# Patient Record
Sex: FEMALE | Race: WHITE
[De-identification: names, ages, dates, MRNs, and addresses within clinical notes are randomized per-mention and may not be internally consistent; named-entity substitution may affect disease eponyms.]

---

## 2018-01-24 ENCOUNTER — HOSPITAL ENCOUNTER (EMERGENCY)
Dept: HOSPITAL 17 - NEPD | Age: 23
Discharge: HOME | End: 2018-01-24
Payer: COMMERCIAL

## 2018-01-24 VITALS
HEART RATE: 96 BPM | OXYGEN SATURATION: 100 % | DIASTOLIC BLOOD PRESSURE: 62 MMHG | RESPIRATION RATE: 28 BRPM | TEMPERATURE: 99 F | SYSTOLIC BLOOD PRESSURE: 129 MMHG

## 2018-01-24 VITALS — WEIGHT: 187.39 LBS | BODY MASS INDEX: 29.41 KG/M2 | HEIGHT: 67 IN

## 2018-01-24 VITALS
DIASTOLIC BLOOD PRESSURE: 70 MMHG | RESPIRATION RATE: 26 BRPM | SYSTOLIC BLOOD PRESSURE: 120 MMHG | OXYGEN SATURATION: 100 % | HEART RATE: 88 BPM

## 2018-01-24 VITALS — HEART RATE: 92 BPM | SYSTOLIC BLOOD PRESSURE: 132 MMHG | DIASTOLIC BLOOD PRESSURE: 79 MMHG

## 2018-01-24 DIAGNOSIS — R07.89: Primary | ICD-10-CM

## 2018-01-24 DIAGNOSIS — J45.909: ICD-10-CM

## 2018-01-24 DIAGNOSIS — R05: ICD-10-CM

## 2018-01-24 DIAGNOSIS — Z79.899: ICD-10-CM

## 2018-01-24 DIAGNOSIS — E87.6: ICD-10-CM

## 2018-01-24 LAB
BASOPHILS # BLD AUTO: 0 TH/MM3 (ref 0–0.2)
BASOPHILS NFR BLD: 0.5 % (ref 0–2)
BUN SERPL-MCNC: 11 MG/DL (ref 7–18)
CALCIUM SERPL-MCNC: 9 MG/DL (ref 8.5–10.1)
CHLORIDE SERPL-SCNC: 111 MEQ/L (ref 98–107)
CREAT SERPL-MCNC: 0.78 MG/DL (ref 0.5–1)
D-DIMER: 0.29 MG/L FEU (ref 0–0.5)
EOSINOPHIL # BLD: 0.2 TH/MM3 (ref 0–0.4)
EOSINOPHIL NFR BLD: 2 % (ref 0–4)
ERYTHROCYTE [DISTWIDTH] IN BLOOD BY AUTOMATED COUNT: 13.4 % (ref 11.6–17.2)
GFR SERPLBLD BASED ON 1.73 SQ M-ARVRAT: 94 ML/MIN (ref 89–?)
GLUCOSE SERPL-MCNC: 91 MG/DL (ref 74–106)
HCO3 BLD-SCNC: 20.4 MEQ/L (ref 21–32)
HCT VFR BLD CALC: 36.8 % (ref 35–46)
HGB BLD-MCNC: 12.9 GM/DL (ref 11.6–15.3)
INR PPP: 1 RATIO
LIPASE: 243 U/L (ref 73–393)
LYMPHOCYTES # BLD AUTO: 1.7 TH/MM3 (ref 1–4.8)
LYMPHOCYTES NFR BLD AUTO: 22.9 % (ref 9–44)
MAGNESIUM SERPL-MCNC: 2 MG/DL (ref 1.5–2.5)
MCH RBC QN AUTO: 30 PG (ref 27–34)
MCHC RBC AUTO-ENTMCNC: 35.1 % (ref 32–36)
MCV RBC AUTO: 85.5 FL (ref 80–100)
MONOCYTE #: 0.7 TH/MM3 (ref 0–0.9)
MONOCYTES NFR BLD: 9.3 % (ref 0–8)
NEUTROPHILS # BLD AUTO: 5 TH/MM3 (ref 1.8–7.7)
NEUTROPHILS NFR BLD AUTO: 65.3 % (ref 16–70)
PLATELET # BLD: 257 TH/MM3 (ref 150–450)
PMV BLD AUTO: 9.2 FL (ref 7–11)
PROTHROMBIN TIME: 10.5 SEC (ref 9.8–11.6)
RBC # BLD AUTO: 4.31 MIL/MM3 (ref 4–5.3)
SODIUM SERPL-SCNC: 141 MEQ/L (ref 136–145)
TROPONIN I SERPL-MCNC: (no result) NG/ML (ref 0.02–0.05)
WBC # BLD AUTO: 7.6 TH/MM3 (ref 4–11)

## 2018-01-24 PROCEDURE — 83690 ASSAY OF LIPASE: CPT

## 2018-01-24 PROCEDURE — 96374 THER/PROPH/DIAG INJ IV PUSH: CPT

## 2018-01-24 PROCEDURE — 85610 PROTHROMBIN TIME: CPT

## 2018-01-24 PROCEDURE — 80048 BASIC METABOLIC PNL TOTAL CA: CPT

## 2018-01-24 PROCEDURE — 84703 CHORIONIC GONADOTROPIN ASSAY: CPT

## 2018-01-24 PROCEDURE — 71045 X-RAY EXAM CHEST 1 VIEW: CPT

## 2018-01-24 PROCEDURE — 85379 FIBRIN DEGRADATION QUANT: CPT

## 2018-01-24 PROCEDURE — 84484 ASSAY OF TROPONIN QUANT: CPT

## 2018-01-24 PROCEDURE — 99285 EMERGENCY DEPT VISIT HI MDM: CPT

## 2018-01-24 PROCEDURE — 83735 ASSAY OF MAGNESIUM: CPT

## 2018-01-24 PROCEDURE — 85025 COMPLETE CBC W/AUTO DIFF WBC: CPT

## 2018-01-24 PROCEDURE — 82550 ASSAY OF CK (CPK): CPT

## 2018-01-24 PROCEDURE — 85730 THROMBOPLASTIN TIME PARTIAL: CPT

## 2018-01-24 PROCEDURE — 93005 ELECTROCARDIOGRAM TRACING: CPT

## 2018-01-24 NOTE — RADRPT
EXAM DATE/TIME:  01/24/2018 17:35 

 

[facility] COMPARISON:     

No previous studies available for comparison.

 

                     

INDICATIONS :     

Chest pain.

                     

 

MEDICAL HISTORY :     

None.          

 

SURGICAL HISTORY :     

None.   

 

ENCOUNTER:     

Initial                                        

 

ACUITY:     

1 day      

 

PAIN SCORE:     

10/10

 

LOCATION:     

Left chest 

 

FINDINGS:     

A single view of the chest demonstrates the lungs to be symmetrically aerated without evidence of mas
s, infiltrate or effusion.  The cardiomediastinal contours are unremarkable.  Osseous structures are 
intact.

 

CONCLUSION:     No acute disease.  

 

 

 

 Peña Miller MD FACR on January 24, 2018 at 17:43           

Board Certified Radiologist.

 This report was verified electronically.

## 2018-01-24 NOTE — PD
Physical Exam


Narrative


I, Dr. Gordillo, have reviewed the advance practice practitioner's documentation and 

am in agreement, met with the patient face to face, made the diagnosis, and the 

medical decision making was done by me.  





*My assessment and Findings: Anxiety vs, costochondritis vs. atypical chest 

pain vs. pneumonia





23yo F with no PMH here with c/o left sided chest pain today.  Pain is worst 

with palpation.  Also felt clammy in bilateral hands.  Pt appears anxious.  

Occasional cough.  Denies any fever, sob, n/v, abdominal pain, focal weakness 

or numbness.  Denies any family history of sudden cardiac death.  Denies any 

cig smoking or cocaine use.  Labs reviewed, no leukocytosis.  H/H normal.  

Troponin negative.  Mild hypokalemia at 3.3, can replace by herself orally.  D-

dimer negative.  CXR negative.  Pt also no cardiac risk factors and pain is 

very atypical.  Pt given multiple pain meds with some improvement.  Pt appears 

anxious so ativan given.  Do not think chest pain is cardiac.  Return 

precautions given.





Data


Data


Last Documented VS





Vital Signs








  Date Time  Temp Pulse Resp B/P (MAP) Pulse Ox O2 Delivery O2 Flow Rate FiO2


 


1/24/18 19:48  92  132/79 (96)    


 


1/24/18 19:00   26  100 Nasal Cannula 2.00 


 


1/24/18 16:39 99.0       








Orders





 Orders


Electrocardiogram (1/24/18 17:11)


Basic Metabolic Panel (Bmp) (1/24/18 17:11)


Ckmb (Isoenzyme) Profile (1/24/18 17:11)


Complete Blood Count With Diff (1/24/18 17:11)


D-Dimer (1/24/18 17:11)


Magnesium (Mg) (1/24/18 17:11)


Prothrombin Time / Inr (Pt) (1/24/18 17:11)


Act Partial Throm Time (Ptt) (1/24/18 17:11)


Troponin I (1/24/18 17:11)


Lipase (1/24/18 17:11)


Chest, Single Ap (1/24/18 17:11)


Ecg Monitoring (1/24/18 17:11)


Iv Access Insert/Monitor (1/24/18 17:11)


Oximetry (1/24/18 17:11)


Sodium Chloride 0.9% Flush (Ns Flush) (1/24/18 17:15)


Ed Urine Pregnancytest Poc (1/24/18 17:11)


Ketorolac Inj (Toradol Inj) (1/24/18 17:30)


Lorazepam (Ativan) (1/24/18 19:00)


Acetaminophen (Tylenol) (1/24/18 19:00)


Acetamin-Hydrocod 325-5 Mg (Norco  5-325 (1/24/18 20:45)





Labs





Laboratory Tests








Test


  1/24/18


16:50


 


White Blood Count 7.6 TH/MM3 


 


Red Blood Count 4.31 MIL/MM3 


 


Hemoglobin 12.9 GM/DL 


 


Hematocrit 36.8 % 


 


Mean Corpuscular Volume 85.5 FL 


 


Mean Corpuscular Hemoglobin 30.0 PG 


 


Mean Corpuscular Hemoglobin


Concent 35.1 % 


 


 


Red Cell Distribution Width 13.4 % 


 


Platelet Count 257 TH/MM3 


 


Mean Platelet Volume 9.2 FL 


 


Neutrophils (%) (Auto) 65.3 % 


 


Lymphocytes (%) (Auto) 22.9 % 


 


Monocytes (%) (Auto) 9.3 % 


 


Eosinophils (%) (Auto) 2.0 % 


 


Basophils (%) (Auto) 0.5 % 


 


Neutrophils # (Auto) 5.0 TH/MM3 


 


Lymphocytes # (Auto) 1.7 TH/MM3 


 


Monocytes # (Auto) 0.7 TH/MM3 


 


Eosinophils # (Auto) 0.2 TH/MM3 


 


Basophils # (Auto) 0.0 TH/MM3 


 


CBC Comment DIFF FINAL 


 


Differential Comment  


 


Prothrombin Time 10.5 SEC 


 


Prothromb Time International


Ratio 1.0 RATIO 


 


 


Activated Partial


Thromboplast Time 23.8 SEC 


 


 


D-Dimer Quantitative (PE/DVT) 0.29 MG/L FEU 


 


Blood Urea Nitrogen 11 MG/DL 


 


Creatinine 0.78 MG/DL 


 


Random Glucose 91 MG/DL 


 


Calcium Level 9.0 MG/DL 


 


Magnesium Level 2.0 MG/DL 


 


Sodium Level 141 MEQ/L 


 


Potassium Level 3.3 MEQ/L 


 


Chloride Level 111 MEQ/L 


 


Carbon Dioxide Level 20.4 MEQ/L 


 


Anion Gap 10 MEQ/L 


 


Estimat Glomerular Filtration


Rate 94 ML/MIN 


 


 


Total Creatine Kinase 95 U/L 


 


Troponin I


  LESS THAN 0.02


NG/ML


 


Lipase 243 U/L 











[facility]


Supervised Visit with MANDI:  Yes


Interpretation(s)


EKG: NSR 81bpm.  Normal axis.  No ST segment elevation or depression.


Diagnosis





 Primary Impression:  


 Atypical chest pain


Patient Instructions:  General Instructions


Departure Forms:  Tests/Procedures





***Additional Instruction:  


Please follow up with your primary care physician in 2-3 days.  Return to the 

ED if symptoms worsen.


***Med/Other Pt SpecificInfo:  Prescription(s) given


Scripts


Ibuprofen (Ibuprofen) 600 Mg Tab


600 MG PO Q8HR Y for PAIN, #20 TAB 0 Refills


   Prov: Tiny Gordillo DO         1/24/18


Disposition:  01 DISCHARGE HOME


Condition:  Stable











Tiny Gordillo DO Jan 24, 2018 18:55
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
## 2018-01-25 NOTE — EKG
Date Performed: 01/24/2018       Time Performed: 16:48:44

 

PTAGE:      22 years

 

EKG:      Sinus rhythm 

 

 NORMAL ECG 

 

NO PREVIOUS TRACING            

 

DOCTOR:   Chas Gill  Interpretating Date/Time  01/25/2018 00:37:28